# Patient Record
Sex: MALE | Race: WHITE | NOT HISPANIC OR LATINO
[De-identification: names, ages, dates, MRNs, and addresses within clinical notes are randomized per-mention and may not be internally consistent; named-entity substitution may affect disease eponyms.]

---
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
## 2022-01-25 NOTE — ASU PATIENT PROFILE, PEDIATRIC - MEDICATION HERBAL REMEDIES, PROFILE
After injection:  Have someone available to drive you home and stay with you for about 4 hours or until you have no weakness or numbness feeling in your limbs (which may happen from local anesthetic and may last for few hours)  Usually we use local anesthetic only without sedation. If you have IV sedation, do not drive or sign legal documents for 24 hours. Your blood pressure may go up temporarily for several days following cortisone injection and should come back down on its own. If you are diabetic, your blood sugar may go up temporarily for several days following cortisone injection and should come back down on its own. If your blood sugar goes above 300, you should contact your primary care physician or your endocrinologist who is managing your diabetes. If you have severe congestive heart failure, the cortisone may worsen the condition temporarily. If you do develop allergic reaction to any of the medications, it could be skin rash and/or itching and you may take Benadryl 25 mg tablet, which is over the counter. If that does not help or you develop any sensation of swelling in the tongue or throat or difficulty in breathing, you should seek immediate medical attention either through Urgent Care or Emergency Room, or call 911. Discharge Medication Instructions Post Pain Procedure: If you were on any of the following blood thinning medications:  Â· Not applicable. I have not changed any of your self-reported or prescribed medications except as above. If you have questions regarding these medications, please contact the provider who prescribed each medication. In case you have questions, call the [place] [facility] program at [phone].     Camila Lopez MD
no

## 2022-07-28 ENCOUNTER — OUTPATIENT (OUTPATIENT)
Dept: OUTPATIENT SERVICES | Age: 22
LOS: 1 days | End: 2022-07-28

## 2022-07-28 ENCOUNTER — APPOINTMENT (OUTPATIENT)
Dept: MRI IMAGING | Facility: HOSPITAL | Age: 22
End: 2022-07-28

## 2022-07-28 DIAGNOSIS — Z98.890 OTHER SPECIFIED POSTPROCEDURAL STATES: Chronic | ICD-10-CM

## 2022-07-28 DIAGNOSIS — G91.9 HYDROCEPHALUS, UNSPECIFIED: ICD-10-CM

## 2022-07-28 DIAGNOSIS — Z98.2 PRESENCE OF CEREBROSPINAL FLUID DRAINAGE DEVICE: Chronic | ICD-10-CM

## 2022-07-28 PROCEDURE — 70551 MRI BRAIN STEM W/O DYE: CPT | Mod: 26

## 2023-02-28 NOTE — H&P PST PEDIATRIC - SURGICAL SITE INCISION
Called and spoke with patient in regards to refill request. Patient states she moved to [place] and no longer establishing care in [place]. New prescriber to follow and manage RX.   
no

## 2023-07-17 ENCOUNTER — APPOINTMENT (OUTPATIENT)
Dept: ORTHOPEDIC SURGERY | Facility: CLINIC | Age: 23
End: 2023-07-17
Payer: COMMERCIAL

## 2023-07-17 VITALS — BODY MASS INDEX: 33.59 KG/M2 | WEIGHT: 214 LBS | HEIGHT: 67 IN

## 2023-07-17 DIAGNOSIS — M25.819 OTHER SPECIFIED JOINT DISORDERS, UNSPECIFIED SHOULDER: ICD-10-CM

## 2023-07-17 PROCEDURE — 73010 X-RAY EXAM OF SHOULDER BLADE: CPT | Mod: LT

## 2023-07-17 PROCEDURE — 99204 OFFICE O/P NEW MOD 45 MIN: CPT

## 2023-07-17 PROCEDURE — 73030 X-RAY EXAM OF SHOULDER: CPT | Mod: LT

## 2023-07-17 RX ORDER — METHYLPREDNISOLONE 4 MG/1
4 TABLET ORAL
Qty: 1 | Refills: 0 | Status: ACTIVE | COMMUNITY
Start: 2023-07-17 | End: 1900-01-01

## 2023-07-17 NOTE — ASSESSMENT
[FreeTextEntry1] : We reviewed the findings and the history.\par Their questions were answered and concerns addressed.\par The options were outlined.\par MDP is prescribed. \par They will check with his neurologist regarding MDP. \par PT is prescribed. \par If elbow symptoms persist, will follow up with repeat xrays of the elbow. \par \par Patient was seen by Dr. Loy Leung.\par Patient was seen by Debbi LINO under the supervision of Dr. Loy Leung.\par Progress note was completed by Debbi LINO.\par Entered by Chasity Jackson acting as scribe.

## 2023-07-17 NOTE — PHYSICAL EXAM
[Left] : left shoulder [Sitting] : sitting [Mild] : mild [] : no sensory deficits [de-identified] : There is decent strength. [TWNoteComboBox4] : passive forward flexion 150 degrees [de-identified] : external rotation 45 degrees

## 2023-07-17 NOTE — IMAGING
[Left] : left shoulder [FreeTextEntry1] : The joints are OK. [FreeTextEntry5] : There is a Type II-III acromion.

## 2023-07-17 NOTE — HISTORY OF PRESENT ILLNESS
[5] : 5 [1] : 2 [Sleep] : sleep [Rest] : rest [Meds] : meds [] : no [FreeTextEntry1] : left shoulder  [FreeTextEntry5] : pt was walking his dog about 2 weeks ago and the dog pulled on the leash. pt states he felt a pop to his shoulder  [FreeTextEntry7] : down his arm  [FreeTextEntry9] : advil [de-identified] : movement [de-identified] : last week [de-identified] : urgent care

## 2023-07-17 NOTE — REASON FOR VISIT
[FreeTextEntry2] : This is a 23 year old LHD M with left shoulder pain after a traction injury walking a large dog  He felt a pop.  No prior history.  He was seen at [facility], but his symptoms persist.  Advil hasn't helped much.  No numbness.  There is hydrocephalus.  He has limited range in his right arm. His mother is here.

## 2023-07-31 ENCOUNTER — APPOINTMENT (OUTPATIENT)
Dept: ORTHOPEDIC SURGERY | Facility: CLINIC | Age: 23
End: 2023-07-31
Payer: COMMERCIAL

## 2023-07-31 DIAGNOSIS — M75.42 IMPINGEMENT SYNDROME OF LEFT SHOULDER: ICD-10-CM

## 2023-07-31 DIAGNOSIS — S46.912D STRAIN OF UNSPECIFIED MUSCLE, FASCIA AND TENDON AT SHOULDER AND UPPER ARM LEVEL, LEFT ARM, SUBSEQUENT ENCOUNTER: ICD-10-CM

## 2023-07-31 DIAGNOSIS — S46.912A STRAIN OF UNSPECIFIED MUSCLE, FASCIA AND TENDON AT SHOULDER AND UPPER ARM LEVEL, LEFT ARM, INITIAL ENCOUNTER: ICD-10-CM

## 2023-07-31 DIAGNOSIS — M25.312 OTHER INSTABILITY, LEFT SHOULDER: ICD-10-CM

## 2023-07-31 PROCEDURE — 99214 OFFICE O/P EST MOD 30 MIN: CPT | Mod: 25

## 2023-07-31 PROCEDURE — 73030 X-RAY EXAM OF SHOULDER: CPT | Mod: LT

## 2023-07-31 PROCEDURE — 20611 DRAIN/INJ JOINT/BURSA W/US: CPT | Mod: LT

## 2023-07-31 NOTE — ASSESSMENT
[FreeTextEntry1] : We discussed his course.  PT is again prescribed.  Aleve use discussed.  An SA injection is planned today.  Questions answered.   Patient seen by Loy Leung M.D. Entered by Chasity Jackson acting as scribe.   Procedure Name: Large Joint Injection / Aspiration: Depomedrol, Lidocaine and Guidance Ultrasound  Large Joint Injection was performed because of pain and inflammation. Depomedrol: An injection of Depomedrol 40 mg , 2 cc. Lidocaine: An injection of Lidocaine 1 mg , 13 cc.  Medication was injected in the left subacromial space. Patient has tried OTC's including aspirin, Ibuprofen, Aleve etc or prescription NSAIDS, and/or exercises at home and/ or physical therapy without satisfactory response. The risks, benefits, and alternatives to steroid injection were explained in full to the patient. Risks outlined include but are not limited to infection, sepsis, bleeding, scarring, skin discoloration, temporary increase in pain, syncopal episode, failure to resolve symptoms, allergic reaction, symptom recurrence, and elevation of blood sugar in diabetics. Patient understood the risks. All questions were answered. After discussion, patient requested an injection. Oral informed consent was obtained.  Sterile preparation with betadine and aseptic technique was utilized for the procedure, including the preparation of the solutions used for the injection. Patient tolerated the procedure well.   Post Procedure Instructions: Patient was advised to call if redness, pain, or fever occur and apply ice for 15 min. out of every hour for the next 12-24 hours as tolerated. Patient was advised to rest the joint(s) for 3 days.  Advised to ice the injection site this evening. Ultrasound Guidance was used for the following reasons: for precise injection in area of tear. Visualization of the needle and placement of injection was performed without complication.

## 2023-07-31 NOTE — HISTORY OF PRESENT ILLNESS
[5] : 5 [1] : 2 [Sleep] : sleep [Rest] : rest [Meds] : meds [] : no [FreeTextEntry1] : left shoulder  [FreeTextEntry5] : pt was walking his dog about 2 weeks ago and the dog pulled on the leash. pt states he felt a pop to his shoulder  [FreeTextEntry7] : down his arm  [FreeTextEntry9] : advil [de-identified] : movement [de-identified] : last week [de-identified] : urgent care

## 2023-07-31 NOTE — REASON FOR VISIT
[FreeTextEntry2] : This is a 23 year old LHD M with left shoulder pain after a traction injury walking a large dog  He felt a pop.  No prior history.  He was seen at [facility], but his symptoms persist.  Advil hasn't helped much.  No numbness.  There is hydrocephalus.  He has limited range in his right arm. His mother is here. MDP did not help at all. He did not go to PT.

## 2023-07-31 NOTE — PHYSICAL EXAM
[Sitting] : sitting [Mild] : mild [de-identified] : There is decent strength. [de-identified] : external rotation 45 degrees [Left] : left elbow [] : tenderness over triceps [FreeTextEntry9] : Supination to 175º [TWNoteComboBox4] : extension 0 degrees [TWNoteComboBox7] : flexion 120 degrees [TWNoteComboBox6] : pronation 60 degrees

## 2024-01-11 NOTE — ASU PATIENT PROFILE, PEDIATRIC - MENTAL HEALTH CONDITIONS/SYMPTOMS, PROFILE
Celestina Prado paged from lab with critical plt count of 20. Marleni Larios NP Notified via secure chat.      none

## 2024-07-31 ENCOUNTER — APPOINTMENT (OUTPATIENT)
Dept: ORTHOPEDIC SURGERY | Facility: CLINIC | Age: 24
End: 2024-07-31
Payer: COMMERCIAL

## 2024-07-31 VITALS — WEIGHT: 214 LBS | BODY MASS INDEX: 33.59 KG/M2 | HEIGHT: 67 IN

## 2024-07-31 DIAGNOSIS — M77.12 LATERAL EPICONDYLITIS, LEFT ELBOW: ICD-10-CM

## 2024-07-31 PROCEDURE — 73080 X-RAY EXAM OF ELBOW: CPT | Mod: LT

## 2024-07-31 PROCEDURE — 99214 OFFICE O/P EST MOD 30 MIN: CPT

## 2024-07-31 RX ORDER — MELOXICAM 15 MG/1
15 TABLET ORAL DAILY
Qty: 30 | Refills: 1 | Status: ACTIVE | COMMUNITY
Start: 2024-07-31 | End: 2024-09-29

## 2024-07-31 NOTE — HISTORY OF PRESENT ILLNESS
[de-identified] : 07/31/2024: CAROLE ADNUJAR , a 24 year old LHD male, presents today for left elbow pain worsening over the last 6 months.

## 2024-07-31 NOTE — ASSESSMENT
[FreeTextEntry1] : Atraumatic chronic left elbow pain in this 25yo bowler. XR unremarkable today. Discussed a comprehensive treatment plan. Will start on meloxicam prescription, discussed r/b/a. Advised course of PT. Reviewed proper gripping. Activities as tolerated. RTC 6 weeks, can consider CSI into left elbow if needed.

## 2024-07-31 NOTE — IMAGING
[de-identified] : Left Elbow Exam:  Inspection: No swelling, no ecchymosis Palpation: Tenderness to palpation over lateral epicondyle Range of motion: Full elbow flexion, extension, supination, pronation Strength: 5/5 strength in flexion, extension, supination, pronation Special testing: Lateral Elbow pain with resisted wrist extension Stability: No Varus or Valgus instability Motor and sensory intact distally [Left] : left elbow [There are no fractures, subluxations or dislocations. No significant abnormalities are seen] : There are no fractures, subluxations or dislocations. No significant abnormalities are seen

## 2024-08-28 ENCOUNTER — APPOINTMENT (OUTPATIENT)
Dept: ORTHOPEDIC SURGERY | Facility: CLINIC | Age: 24
End: 2024-08-28
Payer: COMMERCIAL

## 2024-08-28 VITALS — WEIGHT: 214 LBS | HEIGHT: 67 IN | BODY MASS INDEX: 33.59 KG/M2

## 2024-08-28 DIAGNOSIS — M77.12 LATERAL EPICONDYLITIS, LEFT ELBOW: ICD-10-CM

## 2024-08-28 PROCEDURE — 20550 NJX 1 TENDON SHEATH/LIGAMENT: CPT | Mod: LT

## 2024-08-28 PROCEDURE — 99213 OFFICE O/P EST LOW 20 MIN: CPT | Mod: 25

## 2024-08-28 PROCEDURE — J3490M: CUSTOM

## 2024-08-28 NOTE — IMAGING
[de-identified] : Left Elbow Exam:  Inspection: No swelling, no ecchymosis Palpation: Tenderness to palpation over lateral epicondyle Range of motion: Full elbow flexion, extension, supination, pronation Strength: 5/5 strength in flexion, extension, supination, pronation Special testing: Lateral Elbow pain with resisted wrist extension Stability: No Varus or Valgus instability Motor and sensory intact distally

## 2024-08-28 NOTE — HISTORY OF PRESENT ILLNESS
[7] : 7 [6] : 6 [Dull/Aching] : dull/aching [de-identified] : 8/28/24: Here for f/up left elbow. Reports minimal relief with meloxicam.  [FreeTextEntry1] : L elbow [de-identified] : mobic

## 2024-08-28 NOTE — ASSESSMENT
[FreeTextEntry1] : Persistent left elbow pain despite conservative measures. Will injection left elbow today with CSI to alleviate some inflammation. Advised MRI left elbow to evaluate further. Continue with meloxicam as needed. RTC after MRI to review results.

## 2024-08-28 NOTE — PROCEDURE
[FreeTextEntry3] : Tendon origin injection was performed of the LEFT lateral epicondyle. The indication for this procedure was pain and inflammation. The site was prepped with alcohol, betadine, ethyl chloride sprayed topically and sterile technique used. An injection of Betamethasone (Celestone) 1cc of 3mg, Bupivacaine (Marcaine) 2cc of 0.25% was used. Patient has tried OTC's including aspirin, Ibuprofen, Aleve, etc or prescription NSAIDS, and/or exercises at home and/or physical therapy without satisfactory response, patient had decreased mobility in the joint and the risks benefits, and alternatives have been discussed, and verbal consent was obtained.

## 2024-09-18 ENCOUNTER — APPOINTMENT (OUTPATIENT)
Dept: MRI IMAGING | Facility: CLINIC | Age: 24
End: 2024-09-18

## 2024-09-24 NOTE — H&P PST PEDIATRIC - NEUROLOGIC
[Cervical Spine] : cervical spine [MRI] : MRI [Lumbar Spine] : lumbar spine [I independently reviewed and interpreted images and report] : I independently reviewed and interpreted images and report see HPI

## 2024-09-25 ENCOUNTER — APPOINTMENT (OUTPATIENT)
Dept: ORTHOPEDIC SURGERY | Facility: CLINIC | Age: 24
End: 2024-09-25

## 2024-10-11 ENCOUNTER — APPOINTMENT (OUTPATIENT)
Dept: MRI IMAGING | Facility: CLINIC | Age: 24
End: 2024-10-11

## 2024-12-16 ENCOUNTER — OUTPATIENT (OUTPATIENT)
Dept: OUTPATIENT SERVICES | Age: 24
LOS: 1 days | End: 2024-12-16

## 2024-12-16 ENCOUNTER — APPOINTMENT (OUTPATIENT)
Dept: MRI IMAGING | Facility: HOSPITAL | Age: 24
End: 2024-12-16
Payer: COMMERCIAL

## 2024-12-16 DIAGNOSIS — Z98.2 PRESENCE OF CEREBROSPINAL FLUID DRAINAGE DEVICE: Chronic | ICD-10-CM

## 2024-12-16 DIAGNOSIS — Z98.890 OTHER SPECIFIED POSTPROCEDURAL STATES: Chronic | ICD-10-CM

## 2024-12-16 DIAGNOSIS — M77.12 LATERAL EPICONDYLITIS, LEFT ELBOW: ICD-10-CM

## 2024-12-17 ENCOUNTER — RESULT REVIEW (OUTPATIENT)
Age: 24
End: 2024-12-17

## 2024-12-17 PROCEDURE — 73221 MRI JOINT UPR EXTREM W/O DYE: CPT | Mod: 26,LT

## 2025-02-05 ENCOUNTER — APPOINTMENT (OUTPATIENT)
Dept: ORTHOPEDIC SURGERY | Facility: CLINIC | Age: 25
End: 2025-02-05
Payer: COMMERCIAL

## 2025-02-05 VITALS — BODY MASS INDEX: 33.59 KG/M2 | WEIGHT: 214 LBS | HEIGHT: 67 IN

## 2025-02-05 DIAGNOSIS — M77.12 LATERAL EPICONDYLITIS, LEFT ELBOW: ICD-10-CM

## 2025-02-05 PROCEDURE — 99213 OFFICE O/P EST LOW 20 MIN: CPT

## 2025-02-05 RX ORDER — IBUPROFEN 600 MG/1
600 TABLET, FILM COATED ORAL 3 TIMES DAILY
Qty: 60 | Refills: 1 | Status: ACTIVE | COMMUNITY
Start: 2025-02-05 | End: 2025-03-17
